# Patient Record
Sex: FEMALE | Race: WHITE | Employment: FULL TIME | URBAN - NONMETROPOLITAN AREA
[De-identification: names, ages, dates, MRNs, and addresses within clinical notes are randomized per-mention and may not be internally consistent; named-entity substitution may affect disease eponyms.]

---
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
## 2025-04-01 ENCOUNTER — TELEPHONE (OUTPATIENT)
Dept: NEUROLOGY | Age: 20
End: 2025-04-01

## 2025-04-01 NOTE — TELEPHONE ENCOUNTER
Called and left patient mother a VM to let her know that patient appointment for 04-16-25 with Dr. Chavez had to be rescheduled due to the provider is out of the office. Left on patient mother VM of when I have patient appointment rescheduled too.

## 2025-06-09 ENCOUNTER — TELEPHONE (OUTPATIENT)
Dept: NEUROLOGY | Age: 20
End: 2025-06-09

## 2025-06-09 NOTE — TELEPHONE ENCOUNTER
Called and left patient mom a VM to let her know that patient appointment for 07-10-25 with Dr. Chavez had to be rescheduled due to the provider is out office. Left on VM of when I have patient appointment rescheduled too.